# Patient Record
Sex: FEMALE | Race: WHITE | NOT HISPANIC OR LATINO | URBAN - METROPOLITAN AREA
[De-identification: names, ages, dates, MRNs, and addresses within clinical notes are randomized per-mention and may not be internally consistent; named-entity substitution may affect disease eponyms.]

---

## 2020-07-25 ENCOUNTER — TELEPHONE ENCOUNTER (OUTPATIENT)
Dept: URBAN - METROPOLITAN AREA CLINIC 13 | Facility: CLINIC | Age: 80
End: 2020-07-25

## 2020-07-25 RX ORDER — POLYETHYLENE GLYCOL 3350, SODIUM CHLORIDE, SODIUM BICARBONATE AND POTASSIUM CHLORIDE WITH LEMON FLAVOR 420; 11.2; 5.72; 1.48 G/4L; G/4L; G/4L; G/4L
USE AS DIRECTED POWDER, FOR SOLUTION ORAL
Qty: 1 | Refills: 0 | OUTPATIENT
Start: 2014-06-16 | End: 2014-08-28

## 2020-07-25 RX ORDER — DEXLANSOPRAZOLE 60 MG/1
TAKE 1 CAPSULE BY MOUTH EVERY DAY BEFORE BREAKFAST CAPSULE, DELAYED RELEASE ORAL
Qty: 30 | Refills: 0 | OUTPATIENT
Start: 2017-01-31 | End: 2017-08-29

## 2020-07-25 RX ORDER — DIAPER,BRIEF,INFANT-TODD,DISP
TAKE 2 TABLET DAILY GCN OVER 50+ EACH MISCELLANEOUS
Refills: 0 | OUTPATIENT
Start: 2009-08-31 | End: 2016-12-23

## 2020-07-25 RX ORDER — BUDESONIDE AND FORMOTEROL FUMARATE DIHYDRATE 160; 4.5 UG/1; UG/1
INHALE 2 PUFFS TWICE DAILY AEROSOL RESPIRATORY (INHALATION)
Refills: 0 | OUTPATIENT
Start: 2008-05-29 | End: 2009-01-26

## 2020-07-25 RX ORDER — LUBIPROSTONE 8 UG/1
TAKE 1 CAPSULE TWICE DAILY CAPSULE, GELATIN COATED ORAL
Qty: 60 | Refills: 3 | OUTPATIENT
Start: 2015-11-12 | End: 2016-05-16

## 2020-07-25 RX ORDER — CALCIUM CITRATE/VITAMIN D3 200MG-6.25
TAKE 1 TABLET DAILY. PATIENT STATES UNKNOWN DOSAGE TABLET ORAL
Refills: 0 | OUTPATIENT
Start: 2008-05-29 | End: 2016-12-23

## 2020-07-25 RX ORDER — OMEPRAZOLE 40 MG/1
TAKE 1 CAPSULE TWICE DAILY CAPSULE, DELAYED RELEASE ORAL
Qty: 180 | Refills: 3 | OUTPATIENT
End: 2017-11-07

## 2020-07-25 RX ORDER — RIFAXIMIN 550 MG/1
TAKE 1 TABLET TWICE DAILY X 10 DAYS TABLET ORAL
Qty: 20 | Refills: 0 | OUTPATIENT
Start: 2012-05-09 | End: 2012-06-14

## 2020-07-25 RX ORDER — BUDESONIDE 0.5 MG/2ML
INHALE 1 PUFFS TWICE DAILY INHALANT ORAL
Refills: 0 | OUTPATIENT
End: 2016-12-23

## 2020-07-25 RX ORDER — OMEPRAZOLE 40 MG/1
TAKE 1 CAPSULE TWICE DAILY CAPSULE, DELAYED RELEASE ORAL
Qty: 60 | Refills: 5 | OUTPATIENT
End: 2017-04-24

## 2020-07-25 RX ORDER — FORMOTEROL FUMARATE DIHYDRATE 20 UG/2ML
INHALE 1 PUFFS TWICE DAILY SOLUTION RESPIRATORY (INHALATION)
Refills: 0 | OUTPATIENT
End: 2016-12-23

## 2020-07-25 RX ORDER — MULTIVIT WITH CALCIUM,IRON,MIN 27MG-0.4MG
TAKE 1 TABLET DAILY TABLET ORAL
Refills: 0 | OUTPATIENT
Start: 2008-05-29 | End: 2009-01-26

## 2020-07-25 RX ORDER — OMEPRAZOLE 20 MG/1
TAKE 1 TABLET DAILY TABLET, DELAYED RELEASE ORAL
Refills: 0 | OUTPATIENT
End: 2016-12-23

## 2020-07-25 RX ORDER — ONDANSETRON HYDROCHLORIDE 4 MG/1
TAKE 1 TABLET BY MOUTH EVERY 8 HOURS AS NEEDED FOR NAUSEA OR VOMITING TABLET, FILM COATED ORAL
Qty: 25 | Refills: 0 | OUTPATIENT
Start: 2016-12-31 | End: 2017-04-24

## 2020-07-26 ENCOUNTER — TELEPHONE ENCOUNTER (OUTPATIENT)
Dept: URBAN - METROPOLITAN AREA CLINIC 13 | Facility: CLINIC | Age: 80
End: 2020-07-26

## 2020-07-26 RX ORDER — LEVOTHYROXINE SODIUM 50 UG/1
TAKE 1 TABLET DAILY AS DIRECTED TABLET ORAL
Refills: 0 | Status: ACTIVE | COMMUNITY
Start: 2008-05-29

## 2020-07-26 RX ORDER — HYDROCODONE BITARTRATE AND HOMATROPINE METHYLBROMIDE 5; 1.5 MG/5ML; MG/5ML
TK 1 TEA PO TID PRN FOR 14 DAYS SOLUTION ORAL
Qty: 120 | Refills: 0 | Status: ACTIVE | COMMUNITY
Start: 2011-08-19

## 2020-07-26 RX ORDER — AMOXICILLIN 875 MG/1
TABLET, FILM COATED ORAL
Qty: 14 | Refills: 0 | Status: ACTIVE | COMMUNITY
Start: 2013-06-29

## 2020-07-26 RX ORDER — MOXIFLOXACIN HYDROCHLORIDE TABLETS, 400 MG 400 MG/1
TABLET, FILM COATED ORAL
Qty: 10 | Refills: 0 | Status: ACTIVE | COMMUNITY
Start: 2014-02-07

## 2020-07-26 RX ORDER — AZITHROMYCIN DIHYDRATE 250 MG/1
TABLET, FILM COATED ORAL
Qty: 6 | Refills: 0 | Status: ACTIVE | COMMUNITY
Start: 2014-11-25

## 2020-07-26 RX ORDER — FENOFIBRATE 160 MG/1
TABLET ORAL
Qty: 30 | Refills: 0 | Status: ACTIVE | COMMUNITY
Start: 2013-01-29

## 2020-07-26 RX ORDER — HYDROCORTISONE 20 MG/1
TABLET ORAL
Qty: 240 | Refills: 0 | Status: ACTIVE | COMMUNITY
Start: 2013-09-12

## 2020-07-26 RX ORDER — DEXLANSOPRAZOLE 60 MG/1
TAKE 1 CAPSULE DAILY EVERY MORNING BEFORE BREAKFAST CAPSULE, DELAYED RELEASE ORAL
Qty: 90 | Refills: 1 | Status: ACTIVE | COMMUNITY

## 2020-07-26 RX ORDER — FENOFIBRATE 160 MG/1
TABLET ORAL
Qty: 30 | Refills: 0 | Status: ACTIVE | COMMUNITY
Start: 2013-09-12

## 2020-07-26 RX ORDER — LEVOTHYROXINE SODIUM 0.05 MG/1
TABLET ORAL
Qty: 90 | Refills: 0 | Status: ACTIVE | COMMUNITY
Start: 2013-06-24

## 2020-07-26 RX ORDER — FENOFIBRATE 160 MG/1
TAKE 1 TABLET DAILY TABLET ORAL
Refills: 0 | Status: ACTIVE | COMMUNITY

## 2020-07-26 RX ORDER — FLUCONAZOLE 150 MG/1
TABLET ORAL
Qty: 1 | Refills: 0 | Status: ACTIVE | COMMUNITY
Start: 2013-12-04

## 2020-07-26 RX ORDER — NITROFURANTOIN MONOHYDRATE/MACROCRYSTALLINE 25; 75 MG/1; MG/1
TK ONE C PO BID FOR 7 DAYS CAPSULE ORAL
Qty: 14 | Refills: 0 | Status: ACTIVE | COMMUNITY
Start: 2011-08-03

## 2020-07-26 RX ORDER — PREDNISONE 20 MG/1
TABLET ORAL
Qty: 5 | Refills: 0 | Status: ACTIVE | COMMUNITY
Start: 2013-09-20

## 2020-07-26 RX ORDER — FENOFIBRATE 160 MG/1
TK ONE T PO D TABLET ORAL
Qty: 30 | Refills: 0 | Status: ACTIVE | COMMUNITY
Start: 2010-07-08

## 2020-07-26 RX ORDER — OMEPRAZOLE 40 MG/1
CAPSULE, DELAYED RELEASE ORAL
Qty: 60 | Refills: 0 | Status: ACTIVE | COMMUNITY
Start: 2013-10-29

## 2020-07-26 RX ORDER — PREDNISONE 10 MG/1
TABLET ORAL
Qty: 18 | Refills: 0 | Status: ACTIVE | COMMUNITY
Start: 2014-06-24

## 2020-07-26 RX ORDER — AZELASTINE 137 UG/1
SPRAY, METERED NASAL
Qty: 30 | Refills: 0 | Status: ACTIVE | COMMUNITY
Start: 2014-05-24

## 2020-07-26 RX ORDER — DOXYCYCLINE 100 MG/1
CAPSULE ORAL
Qty: 10 | Refills: 0 | Status: ACTIVE | COMMUNITY
Start: 2014-05-24

## 2020-07-26 RX ORDER — CEPHALEXIN 500 MG/1
TK 4 CS PO 1 HOUR PRIOR TO APPOINTMENT CAPSULE ORAL
Qty: 12 | Refills: 0 | Status: ACTIVE | COMMUNITY
Start: 2011-11-07

## 2020-07-26 RX ORDER — FEXOFENADINE HCL 180 MG/1
TAKE 1 TABLET DAILY TABLET ORAL
Refills: 0 | Status: ACTIVE | COMMUNITY

## 2020-07-26 RX ORDER — FLUCONAZOLE 100 MG/1
TABLET ORAL
Qty: 3 | Refills: 0 | Status: ACTIVE | COMMUNITY
Start: 2014-12-02

## 2020-07-26 RX ORDER — OMEPRAZOLE 40 MG/1
TAKE 1 CAPSULE BEDTIME CAPSULE, DELAYED RELEASE ORAL
Refills: 6 | Status: ACTIVE | COMMUNITY

## 2020-07-26 RX ORDER — SUCRALFATE 1 G/10ML
SUSPENSION ORAL
Qty: 255 | Refills: 0 | Status: ACTIVE | COMMUNITY
Start: 2014-03-31

## 2020-07-26 RX ORDER — CYCLOBENZAPRINE HYDROCHLORIDE 5 MG/1
TABLET, FILM COATED ORAL
Qty: 30 | Refills: 0 | Status: ACTIVE | COMMUNITY
Start: 2014-09-17

## 2020-07-26 RX ORDER — HYDROCORTISONE VALERATE CREAM 2 MG/G
CREAM TOPICAL
Qty: 60 | Refills: 0 | Status: ACTIVE | COMMUNITY
Start: 2012-06-11

## 2020-07-26 RX ORDER — CEFUROXIME AXETIL 500 MG/1
TABLET, FILM COATED ORAL
Qty: 14 | Refills: 0 | Status: ACTIVE | COMMUNITY
Start: 2014-05-29

## 2020-07-26 RX ORDER — RALOXIFENE HCL 60 MG
TK 1 T PO QD TABLET ORAL
Qty: 30 | Refills: 0 | Status: ACTIVE | COMMUNITY
Start: 2011-07-11

## 2020-07-26 RX ORDER — FORMOTEROL FUMARATE DIHYDRATE 20 UG/2ML
USE 1 VIAL IN NEBULIZER EVERY 12 HOURS SOLUTION RESPIRATORY (INHALATION)
Refills: 0 | Status: ACTIVE | COMMUNITY

## 2020-07-26 RX ORDER — AZITHROMYCIN DIHYDRATE 250 MG/1
FPD TABLET, FILM COATED ORAL
Qty: 6 | Refills: 0 | Status: ACTIVE | COMMUNITY
Start: 2011-08-19

## 2020-07-26 RX ORDER — BUDESONIDE 0.5 MG/2ML
USE 1 UNIT DOSE VIA NEBULIZER TWO TIMES A DAY INHALANT ORAL
Refills: 0 | Status: ACTIVE | COMMUNITY

## 2020-07-26 RX ORDER — OMEPRAZOLE 40 MG/1
CAPSULE, DELAYED RELEASE ORAL
Qty: 60 | Refills: 0 | Status: ACTIVE | COMMUNITY
Start: 2013-04-02

## 2020-07-26 RX ORDER — PREDNISONE 20 MG/1
TABLET ORAL
Qty: 5 | Refills: 0 | Status: ACTIVE | COMMUNITY
Start: 2014-06-10

## 2020-07-26 RX ORDER — FAMOTIDINE 20 MG/1
TAKE 1 TABLET DAILY AS DIRECTED TABLET, FILM COATED ORAL
Refills: 0 | Status: ACTIVE | COMMUNITY

## 2020-07-26 RX ORDER — ONDANSETRON HYDROCHLORIDE 4 MG/1
TABLET, FILM COATED ORAL
Qty: 12 | Refills: 0 | Status: ACTIVE | COMMUNITY
Start: 2014-11-10

## 2020-07-26 RX ORDER — FLUTICASONE PROPIONATE 50 UG/1
SPRAY, METERED NASAL
Qty: 16 | Refills: 0 | Status: ACTIVE | COMMUNITY
Start: 2014-05-24

## 2020-07-26 RX ORDER — AMOXICILLIN 875 MG/1
TABLET, FILM COATED ORAL
Qty: 20 | Refills: 0 | Status: ACTIVE | COMMUNITY
Start: 2014-12-17

## 2020-07-26 RX ORDER — OLMESARTAN MEDOXOMIL 20 MG/1
TAKE 1 TABLET DAILY TABLET, FILM COATED ORAL
Refills: 0 | Status: ACTIVE | COMMUNITY
Start: 2011-03-03

## 2020-07-26 RX ORDER — FLUCONAZOLE 150 MG/1
TABLET ORAL
Qty: 1 | Refills: 0 | Status: ACTIVE | COMMUNITY
Start: 2013-10-03

## 2021-04-20 ENCOUNTER — CLAIMS CREATED FROM THE CLAIM WINDOW (OUTPATIENT)
Dept: URBAN - METROPOLITAN AREA MEDICAL CENTER 19 | Facility: MEDICAL CENTER | Age: 81
End: 2021-04-20
Payer: MEDICARE

## 2021-04-20 DIAGNOSIS — R11.2 NAUSEA WITH VOMITING, UNSPECIFIED: ICD-10-CM

## 2021-04-20 DIAGNOSIS — R74.8 ABNORMAL ALKALINE PHOSPHATASE TEST: ICD-10-CM

## 2021-04-20 DIAGNOSIS — R10.11 RIGHT UPPER QUADRANT PAIN: ICD-10-CM

## 2021-04-20 DIAGNOSIS — K30 FUNCTIONAL DYSPEPSIA: ICD-10-CM

## 2021-04-20 DIAGNOSIS — K21.9 GASTRO-ESOPHAGEAL REFLUX DISEASE WITHOUT ESOPHAGITIS: ICD-10-CM

## 2021-04-20 PROCEDURE — 99222 1ST HOSP IP/OBS MODERATE 55: CPT | Performed by: INTERNAL MEDICINE

## 2021-04-21 ENCOUNTER — OFFICE VISIT (OUTPATIENT)
Dept: URBAN - METROPOLITAN AREA CLINIC 107 | Facility: CLINIC | Age: 81
End: 2021-04-21

## 2021-04-21 ENCOUNTER — CLAIMS CREATED FROM THE CLAIM WINDOW (OUTPATIENT)
Dept: URBAN - METROPOLITAN AREA MEDICAL CENTER 19 | Facility: MEDICAL CENTER | Age: 81
End: 2021-04-21
Payer: MEDICARE

## 2021-04-21 DIAGNOSIS — D64.89 ANEMIA DUE TO OTHER CAUSE: ICD-10-CM

## 2021-04-21 DIAGNOSIS — R11.2 NAUSEA WITH VOMITING, UNSPECIFIED: ICD-10-CM

## 2021-04-21 DIAGNOSIS — K31.819 ANGIODYSPLASIA OF STOMACH AND DUODENUM WITHOUT BLEEDING: ICD-10-CM

## 2021-04-21 PROCEDURE — 43270 EGD LESION ABLATION: CPT | Performed by: INTERNAL MEDICINE

## 2021-04-21 RX ORDER — OLMESARTAN MEDOXOMIL 20 MG/1
TAKE 1 TABLET DAILY TABLET, FILM COATED ORAL
Refills: 0 | Status: ACTIVE | COMMUNITY
Start: 2011-03-03

## 2021-04-21 RX ORDER — FORMOTEROL FUMARATE DIHYDRATE 20 UG/2ML
USE 1 VIAL IN NEBULIZER EVERY 12 HOURS SOLUTION RESPIRATORY (INHALATION)
Refills: 0 | Status: ACTIVE | COMMUNITY

## 2021-04-21 RX ORDER — CEFUROXIME AXETIL 500 MG/1
TABLET, FILM COATED ORAL
Qty: 14 | Refills: 0 | Status: ACTIVE | COMMUNITY
Start: 2014-05-29

## 2021-04-21 RX ORDER — AMOXICILLIN 875 MG/1
TABLET, FILM COATED ORAL
Qty: 14 | Refills: 0 | Status: ACTIVE | COMMUNITY
Start: 2013-06-29

## 2021-04-21 RX ORDER — AZELASTINE 137 UG/1
SPRAY, METERED NASAL
Qty: 30 | Refills: 0 | Status: ACTIVE | COMMUNITY
Start: 2014-05-24

## 2021-04-21 RX ORDER — SUCRALFATE 1 G/10ML
SUSPENSION ORAL
Qty: 255 | Refills: 0 | Status: ACTIVE | COMMUNITY
Start: 2014-03-31

## 2021-04-21 RX ORDER — NITROFURANTOIN MONOHYDRATE/MACROCRYSTALLINE 25; 75 MG/1; MG/1
TK ONE C PO BID FOR 7 DAYS CAPSULE ORAL
Qty: 14 | Refills: 0 | Status: ACTIVE | COMMUNITY
Start: 2011-08-03

## 2021-04-21 RX ORDER — CEPHALEXIN 500 MG/1
TK 4 CS PO 1 HOUR PRIOR TO APPOINTMENT CAPSULE ORAL
Qty: 12 | Refills: 0 | Status: ACTIVE | COMMUNITY
Start: 2011-11-07

## 2021-04-21 RX ORDER — FAMOTIDINE 20 MG/1
TAKE 1 TABLET DAILY AS DIRECTED TABLET, FILM COATED ORAL
Refills: 0 | Status: ACTIVE | COMMUNITY

## 2021-04-21 RX ORDER — OMEPRAZOLE 40 MG/1
TAKE 1 CAPSULE BEDTIME CAPSULE, DELAYED RELEASE ORAL
Refills: 6 | Status: ACTIVE | COMMUNITY

## 2021-04-21 RX ORDER — FLUCONAZOLE 100 MG/1
TABLET ORAL
Qty: 3 | Refills: 0 | Status: ACTIVE | COMMUNITY
Start: 2014-12-02

## 2021-04-21 RX ORDER — BUDESONIDE 0.5 MG/2ML
USE 1 UNIT DOSE VIA NEBULIZER TWO TIMES A DAY INHALANT ORAL
Refills: 0 | Status: ACTIVE | COMMUNITY

## 2021-04-21 RX ORDER — LEVOTHYROXINE SODIUM 0.05 MG/1
TABLET ORAL
Qty: 90 | Refills: 0 | Status: ACTIVE | COMMUNITY
Start: 2013-06-24

## 2021-04-21 RX ORDER — LEVOTHYROXINE SODIUM 50 UG/1
TAKE 1 TABLET DAILY AS DIRECTED TABLET ORAL
Refills: 0 | Status: ACTIVE | COMMUNITY
Start: 2008-05-29

## 2021-04-21 RX ORDER — FENOFIBRATE 160 MG/1
TK ONE T PO D TABLET ORAL
Qty: 30 | Refills: 0 | Status: ACTIVE | COMMUNITY
Start: 2010-07-08

## 2021-04-21 RX ORDER — RALOXIFENE HCL 60 MG
TK 1 T PO QD TABLET ORAL
Qty: 30 | Refills: 0 | Status: ACTIVE | COMMUNITY
Start: 2011-07-11

## 2021-04-21 RX ORDER — DOXYCYCLINE 100 MG/1
CAPSULE ORAL
Qty: 10 | Refills: 0 | Status: ACTIVE | COMMUNITY
Start: 2014-05-24

## 2021-04-21 RX ORDER — HYDROCORTISONE VALERATE CREAM 2 MG/G
CREAM TOPICAL
Qty: 60 | Refills: 0 | Status: ACTIVE | COMMUNITY
Start: 2012-06-11

## 2021-04-21 RX ORDER — MOXIFLOXACIN HYDROCHLORIDE TABLETS, 400 MG 400 MG/1
TABLET, FILM COATED ORAL
Qty: 10 | Refills: 0 | Status: ACTIVE | COMMUNITY
Start: 2014-02-07

## 2021-04-21 RX ORDER — PREDNISONE 10 MG/1
TABLET ORAL
Qty: 18 | Refills: 0 | Status: ACTIVE | COMMUNITY
Start: 2014-06-24

## 2021-04-21 RX ORDER — ONDANSETRON HYDROCHLORIDE 4 MG/1
TABLET, FILM COATED ORAL
Qty: 12 | Refills: 0 | Status: ACTIVE | COMMUNITY
Start: 2014-11-10

## 2021-04-21 RX ORDER — HYDROCODONE BITARTRATE AND HOMATROPINE METHYLBROMIDE 5; 1.5 MG/5ML; MG/5ML
TK 1 TEA PO TID PRN FOR 14 DAYS SOLUTION ORAL
Qty: 120 | Refills: 0 | Status: ACTIVE | COMMUNITY
Start: 2011-08-19

## 2021-04-21 RX ORDER — DEXLANSOPRAZOLE 60 MG/1
TAKE 1 CAPSULE DAILY EVERY MORNING BEFORE BREAKFAST CAPSULE, DELAYED RELEASE ORAL
Qty: 90 | Refills: 1 | Status: ACTIVE | COMMUNITY

## 2021-04-21 RX ORDER — FEXOFENADINE HCL 180 MG/1
TAKE 1 TABLET DAILY TABLET ORAL
Refills: 0 | Status: ACTIVE | COMMUNITY

## 2021-04-21 RX ORDER — FENOFIBRATE 160 MG/1
TAKE 1 TABLET DAILY TABLET ORAL
Refills: 0 | Status: ACTIVE | COMMUNITY

## 2021-04-21 RX ORDER — CYCLOBENZAPRINE HYDROCHLORIDE 5 MG/1
TABLET, FILM COATED ORAL
Qty: 30 | Refills: 0 | Status: ACTIVE | COMMUNITY
Start: 2014-09-17

## 2021-04-21 RX ORDER — HYDROCORTISONE 20 MG/1
TABLET ORAL
Qty: 240 | Refills: 0 | Status: ACTIVE | COMMUNITY
Start: 2013-09-12

## 2021-04-21 RX ORDER — FLUCONAZOLE 150 MG/1
TABLET ORAL
Qty: 1 | Refills: 0 | Status: ACTIVE | COMMUNITY
Start: 2013-10-03

## 2021-04-21 RX ORDER — PREDNISONE 20 MG/1
TABLET ORAL
Qty: 5 | Refills: 0 | Status: ACTIVE | COMMUNITY
Start: 2013-09-20

## 2021-04-21 RX ORDER — AZITHROMYCIN DIHYDRATE 250 MG/1
FPD TABLET, FILM COATED ORAL
Qty: 6 | Refills: 0 | Status: ACTIVE | COMMUNITY
Start: 2011-08-19

## 2021-04-21 RX ORDER — FLUTICASONE PROPIONATE 50 UG/1
SPRAY, METERED NASAL
Qty: 16 | Refills: 0 | Status: ACTIVE | COMMUNITY
Start: 2014-05-24

## 2021-04-21 NOTE — HPI-TODAY'S VISIT:
Ms. Davis is a 77-year-old  female with a history of atrial fibrillation status post cardioversion, asthma, GERD, and nausea presents for follow-up.   She was last seen here in November 2017.   She was previously seen on 8/29 /17.  At that time, she reported a 1 year history of illness.  She described multiple pulmonary infections throughout the previous year which were felt to possibly be consistent with reflux.  She hasn't had a productive cough and a recent history of pneumonia at the time.  She tells me that she had a bronchoscopy performed which revealed "acid in her lungs."  CT scan of the chest was done on 12 2017, but I do not have the results.   The patient is better from a pulmonary standpoint.  However, she is concerned that reflux may be contributing to her recurrent pulmonary infections.

## 2021-04-22 ENCOUNTER — CLAIMS CREATED FROM THE CLAIM WINDOW (OUTPATIENT)
Dept: URBAN - METROPOLITAN AREA MEDICAL CENTER 19 | Facility: MEDICAL CENTER | Age: 81
End: 2021-04-22
Payer: MEDICARE

## 2021-04-22 DIAGNOSIS — K31.819 ANGIODYSPLASIA OF STOMACH AND DUODENUM WITHOUT BLEEDING: ICD-10-CM

## 2021-04-22 DIAGNOSIS — K21.9 GASTRO-ESOPHAGEAL REFLUX DISEASE WITHOUT ESOPHAGITIS: ICD-10-CM

## 2021-04-22 DIAGNOSIS — R11.2 NAUSEA WITH VOMITING, UNSPECIFIED: ICD-10-CM

## 2021-04-22 PROCEDURE — 99232 SBSQ HOSP IP/OBS MODERATE 35: CPT | Performed by: INTERNAL MEDICINE

## 2021-05-25 ENCOUNTER — OFFICE VISIT (OUTPATIENT)
Dept: URBAN - METROPOLITAN AREA CLINIC 113 | Facility: CLINIC | Age: 81
End: 2021-05-25
Payer: MEDICARE

## 2021-05-25 ENCOUNTER — TELEPHONE ENCOUNTER (OUTPATIENT)
Dept: URBAN - METROPOLITAN AREA CLINIC 113 | Facility: CLINIC | Age: 81
End: 2021-05-25

## 2021-05-25 VITALS
RESPIRATION RATE: 20 BRPM | BODY MASS INDEX: 18.99 KG/M2 | WEIGHT: 114 LBS | HEIGHT: 65 IN | HEART RATE: 69 BPM | TEMPERATURE: 96.6 F | DIASTOLIC BLOOD PRESSURE: 76 MMHG | SYSTOLIC BLOOD PRESSURE: 184 MMHG

## 2021-05-25 DIAGNOSIS — K21.9 GERD: ICD-10-CM

## 2021-05-25 DIAGNOSIS — K59.01 SLOW TRANSIT CONSTIPATION: ICD-10-CM

## 2021-05-25 DIAGNOSIS — K31.811 AVM (ARTERIOVENOUS MALFORMATION) OF STOMACH, ACQUIRED WITH HEMORRHAGE: ICD-10-CM

## 2021-05-25 DIAGNOSIS — D64.9 NORMOCYTIC ANEMIA: ICD-10-CM

## 2021-05-25 PROCEDURE — 99214 OFFICE O/P EST MOD 30 MIN: CPT | Performed by: INTERNAL MEDICINE

## 2021-05-25 RX ORDER — AZELASTINE 137 UG/1
SPRAY, METERED NASAL
Qty: 30 | Refills: 0 | COMMUNITY
Start: 2014-05-24

## 2021-05-25 RX ORDER — FLUCONAZOLE 100 MG/1
TABLET ORAL
Qty: 3 | Refills: 0 | COMMUNITY
Start: 2014-12-02

## 2021-05-25 RX ORDER — ONDANSETRON HYDROCHLORIDE 4 MG/1
TABLET, FILM COATED ORAL
Qty: 12 | Refills: 0 | COMMUNITY
Start: 2014-11-10

## 2021-05-25 RX ORDER — RALOXIFENE HCL 60 MG
TK 1 T PO QD TABLET ORAL
Qty: 30 | Refills: 0 | COMMUNITY
Start: 2011-07-11

## 2021-05-25 RX ORDER — PREDNISONE 20 MG/1
TABLET ORAL
Qty: 5 | Refills: 0 | COMMUNITY
Start: 2013-09-20

## 2021-05-25 RX ORDER — FORMOTEROL FUMARATE DIHYDRATE 20 UG/2ML
USE 1 VIAL IN NEBULIZER EVERY 12 HOURS SOLUTION RESPIRATORY (INHALATION)
Refills: 0 | COMMUNITY

## 2021-05-25 RX ORDER — CEFUROXIME AXETIL 500 MG/1
TABLET, FILM COATED ORAL
Qty: 14 | Refills: 0 | COMMUNITY
Start: 2014-05-29

## 2021-05-25 RX ORDER — AZITHROMYCIN DIHYDRATE 250 MG/1
FPD TABLET, FILM COATED ORAL
Qty: 6 | Refills: 0 | COMMUNITY
Start: 2011-08-19

## 2021-05-25 RX ORDER — DEXLANSOPRAZOLE 60 MG/1
TAKE 1 CAPSULE DAILY EVERY MORNING BEFORE BREAKFAST CAPSULE, DELAYED RELEASE ORAL
Qty: 90 | Refills: 1 | Status: ACTIVE | COMMUNITY

## 2021-05-25 RX ORDER — MOXIFLOXACIN HYDROCHLORIDE TABLETS, 400 MG 400 MG/1
TABLET, FILM COATED ORAL
Qty: 10 | Refills: 0 | COMMUNITY
Start: 2014-02-07

## 2021-05-25 RX ORDER — BUDESONIDE 0.5 MG/2ML
USE 1 UNIT DOSE VIA NEBULIZER TWO TIMES A DAY INHALANT ORAL
Refills: 0 | Status: ACTIVE | COMMUNITY

## 2021-05-25 RX ORDER — FORMOTEROL FUMARATE DIHYDRATE 20 UG/2ML
2 ML SOLUTION RESPIRATORY (INHALATION) TWICE A DAY
Status: ACTIVE | COMMUNITY

## 2021-05-25 RX ORDER — LEVOTHYROXINE SODIUM 50 UG/1
TAKE 1 TABLET DAILY AS DIRECTED TABLET ORAL
Refills: 0 | Status: ACTIVE | COMMUNITY
Start: 2008-05-29

## 2021-05-25 RX ORDER — OMEPRAZOLE 40 MG/1
TAKE 1 CAPSULE BEDTIME CAPSULE, DELAYED RELEASE ORAL
Refills: 6 | COMMUNITY

## 2021-05-25 RX ORDER — FLUCONAZOLE 150 MG/1
TABLET ORAL
Qty: 1 | Refills: 0 | COMMUNITY
Start: 2013-10-03

## 2021-05-25 RX ORDER — FLUTICASONE PROPIONATE 50 UG/1
SPRAY, METERED NASAL
Qty: 16 | Refills: 0 | COMMUNITY
Start: 2014-05-24

## 2021-05-25 RX ORDER — FENOFIBRATE 160 MG/1
TK ONE T PO D TABLET ORAL
Qty: 30 | Refills: 0 | COMMUNITY
Start: 2010-07-08

## 2021-05-25 RX ORDER — FAMOTIDINE 20 MG/1
TAKE 1 TABLET DAILY AS DIRECTED TABLET, FILM COATED ORAL
Refills: 0 | COMMUNITY

## 2021-05-25 RX ORDER — VENLAFAXINE HYDROCHLORIDE 37.5 MG/1
1 CAPSULE WITH FOOD CAPSULE, EXTENDED RELEASE ORAL ONCE A DAY
Status: ACTIVE | COMMUNITY

## 2021-05-25 RX ORDER — FEXOFENADINE HCL 180 MG/1
TAKE 1 TABLET DAILY TABLET ORAL
Refills: 0 | COMMUNITY

## 2021-05-25 RX ORDER — HYDROCORTISONE 20 MG/1
TABLET ORAL
Qty: 240 | Refills: 0 | COMMUNITY
Start: 2013-09-12

## 2021-05-25 RX ORDER — AMOXICILLIN 875 MG/1
1 CAPSULE TABLET, FILM COATED ORAL TWICE A DAY
Refills: 0 | Status: ACTIVE | COMMUNITY
Start: 2013-06-29

## 2021-05-25 RX ORDER — HYDROCODONE BITARTRATE AND HOMATROPINE METHYLBROMIDE 5; 1.5 MG/5ML; MG/5ML
TK 1 TEA PO TID PRN FOR 14 DAYS SOLUTION ORAL
Qty: 120 | Refills: 0 | COMMUNITY
Start: 2011-08-19

## 2021-05-25 RX ORDER — CYCLOBENZAPRINE HYDROCHLORIDE 5 MG/1
TABLET, FILM COATED ORAL
Qty: 30 | Refills: 0 | COMMUNITY
Start: 2014-09-17

## 2021-05-25 RX ORDER — FENOFIBRATE 160 MG/1
TAKE 1 TABLET DAILY TABLET ORAL
Refills: 0 | COMMUNITY

## 2021-05-25 RX ORDER — OLMESARTAN MEDOXOMIL 20 MG/1
TAKE 1 TABLET DAILY TABLET, FILM COATED ORAL
Refills: 0 | Status: ACTIVE | COMMUNITY
Start: 2011-03-03

## 2021-05-25 RX ORDER — SUCRALFATE 1 G/10ML
SUSPENSION ORAL
Qty: 255 | Refills: 0 | COMMUNITY
Start: 2014-03-31

## 2021-05-25 RX ORDER — HYDROCORTISONE VALERATE CREAM 2 MG/G
CREAM TOPICAL
Qty: 60 | Refills: 0 | COMMUNITY
Start: 2012-06-11

## 2021-05-25 RX ORDER — PREDNISONE 10 MG/1
TABLET ORAL
Qty: 18 | Refills: 0 | COMMUNITY
Start: 2014-06-24

## 2021-05-25 RX ORDER — NITROFURANTOIN MONOHYDRATE/MACROCRYSTALLINE 25; 75 MG/1; MG/1
TK ONE C PO BID FOR 7 DAYS CAPSULE ORAL
Qty: 14 | Refills: 0 | COMMUNITY
Start: 2011-08-03

## 2021-05-25 RX ORDER — CEPHALEXIN 500 MG/1
TK 4 CS PO 1 HOUR PRIOR TO APPOINTMENT CAPSULE ORAL
Qty: 12 | Refills: 0 | COMMUNITY
Start: 2011-11-07

## 2021-05-25 RX ORDER — LEVOTHYROXINE SODIUM 0.05 MG/1
TABLET ORAL
Qty: 90 | Refills: 0 | COMMUNITY
Start: 2013-06-24

## 2021-05-25 RX ORDER — MONTELUKAST SODIUM 10 MG/1
1 TABLET TABLET, FILM COATED ORAL ONCE A DAY
Status: ACTIVE | COMMUNITY

## 2021-05-25 RX ORDER — DOXYCYCLINE 100 MG/1
CAPSULE ORAL
Qty: 10 | Refills: 0 | COMMUNITY
Start: 2014-05-24

## 2021-05-25 NOTE — HPI-OTHER HISTORIES
CT of the abdomen pelvis with contrast on 4/14/2021 showed diverticulosis and bilateral renal cysts. Abdominal ultrasound 2/23/2021 revealed mild intrahepatic biliary duct dilation with enlarged common bile duct measuring 10 mm, which may be physiologic due to prior cholecystectomy.  Also notable for chronic medical renal disease. Her last colonoscopy was performed 6/14/2016 by Dr. Flowers.  Findings included diverticulosis in the sigmoid, descending and transverse colon and one 8 mm tubular adenoma in the ascending colon.

## 2021-05-25 NOTE — PHYSICAL EXAM NEUROLOGIC:
Noted. This was also stated in a separate encounter from 1/14/21 that was sent to PCP.   oriented to person, place and time , normal mood with an appropriate affect

## 2021-05-25 NOTE — HPI-TODAY'S VISIT:
80-year-old female with a history of atrial fibrillation status post cardioversion, asthma, GERD, presenting for follow-up after hospitalization.  She was last seen in the office in November 2017 by Dr. Flowers for follow-up regarding GERD and recurrent pulmonary infections, with question of recurrent aspiration.  An EGD with Bravo pH was recommended to assess for pathologic reflux, but she did not proceed with the procedure. She was seen in hospital consultation on 4/20/2021 by Dr. Blount for evaluation of abdominal pain and nausea/vomiting.  Recent CT and abdominal ultrasound were unremarkable.  She was recommended pantoprazole 40 mg twice daily and famotidine 40 mg prior to bedtime for management of GERD and functional dyspepsia. An EGD was planned for evaluation of anemia. 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 Her constipation has improved with a high fiber diet. Her bowel habits are regular without blood per rectum. On occasion, she does have some difficulty with evacuation and straining. Her appetite has improved, and weight stabilized with initiation of Boost 1 can daily. She has not experienced further abdominal pain. She has occasional nausea which responds to Zofran when needed. No vomiting. Her reflux is managed with Dexilant. She has infrequent heartburn which responds to Tums when needed. She is currently on amoxicillin for treatment of an upper respiratory infection. Her hemoglobin was 9.9 at the time of hospital discharge, with MCV 86.5. Her bloodwork has not been repeated. Her daughter is on speaker phone throughout the interview.

## 2021-05-26 ENCOUNTER — TELEPHONE ENCOUNTER (OUTPATIENT)
Dept: URBAN - METROPOLITAN AREA CLINIC 113 | Facility: CLINIC | Age: 81
End: 2021-05-26

## 2021-05-26 LAB
BASO (ABSOLUTE): 0
BASOS: 1
EOS (ABSOLUTE): 0
EOS: 1
FERRITIN, SERUM: 131
HEMATOCRIT: 32.3
HEMATOLOGY COMMENTS:: (no result)
HEMOGLOBIN: 10.3
IMMATURE CELLS: (no result)
IMMATURE GRANS (ABS): 0
IMMATURE GRANULOCYTES: 0
LYMPHS (ABSOLUTE): 1.4
LYMPHS: 29
MCH: 28.9
MCHC: 31.9
MCV: 91
MONOCYTES(ABSOLUTE): 1
MONOCYTES: 21
NEUTROPHILS (ABSOLUTE): 2.3
NEUTROPHILS: 48
NRBC: (no result)
PLATELETS: 306
RBC: 3.57
RDW: 12.5
WBC: 4.7

## 2021-05-26 RX ORDER — FLUCONAZOLE 100 MG/1
TABLET ORAL
Qty: 3 | Refills: 0 | COMMUNITY
Start: 2014-12-02

## 2021-05-26 RX ORDER — FLUTICASONE PROPIONATE 50 UG/1
SPRAY, METERED NASAL
Qty: 16 | Refills: 0 | COMMUNITY
Start: 2014-05-24

## 2021-05-26 RX ORDER — CEPHALEXIN 500 MG/1
TK 4 CS PO 1 HOUR PRIOR TO APPOINTMENT CAPSULE ORAL
Qty: 12 | Refills: 0 | COMMUNITY
Start: 2011-11-07

## 2021-05-26 RX ORDER — CYCLOBENZAPRINE HYDROCHLORIDE 5 MG/1
TABLET, FILM COATED ORAL
Qty: 30 | Refills: 0 | COMMUNITY
Start: 2014-09-17

## 2021-05-26 RX ORDER — SUCRALFATE 1 G/10ML
SUSPENSION ORAL
Qty: 255 | Refills: 0 | COMMUNITY
Start: 2014-03-31

## 2021-05-26 RX ORDER — OMEPRAZOLE 40 MG/1
TAKE 1 CAPSULE BEDTIME CAPSULE, DELAYED RELEASE ORAL
Refills: 6 | COMMUNITY

## 2021-05-26 RX ORDER — LEVOTHYROXINE SODIUM 0.05 MG/1
TABLET ORAL
Qty: 90 | Refills: 0 | COMMUNITY
Start: 2013-06-24

## 2021-05-26 RX ORDER — ONDANSETRON HYDROCHLORIDE 4 MG/1
TABLET, FILM COATED ORAL
Qty: 12 | Refills: 0 | COMMUNITY
Start: 2014-11-10

## 2021-05-26 RX ORDER — FAMOTIDINE 20 MG/1
TAKE 1 TABLET DAILY AS DIRECTED TABLET, FILM COATED ORAL
Refills: 0 | COMMUNITY

## 2021-05-26 RX ORDER — AZITHROMYCIN DIHYDRATE 250 MG/1
FPD TABLET, FILM COATED ORAL
Qty: 6 | Refills: 0 | COMMUNITY
Start: 2011-08-19

## 2021-05-26 RX ORDER — HYDROCODONE BITARTRATE AND HOMATROPINE METHYLBROMIDE 5; 1.5 MG/5ML; MG/5ML
TK 1 TEA PO TID PRN FOR 14 DAYS SOLUTION ORAL
Qty: 120 | Refills: 0 | COMMUNITY
Start: 2011-08-19

## 2021-05-26 RX ORDER — DOXYCYCLINE 100 MG/1
CAPSULE ORAL
Qty: 10 | Refills: 0 | COMMUNITY
Start: 2014-05-24

## 2021-05-26 RX ORDER — FORMOTEROL FUMARATE DIHYDRATE 20 UG/2ML
USE 1 VIAL IN NEBULIZER EVERY 12 HOURS SOLUTION RESPIRATORY (INHALATION)
Refills: 0 | COMMUNITY

## 2021-05-26 RX ORDER — BUDESONIDE 0.5 MG/2ML
USE 1 UNIT DOSE VIA NEBULIZER TWO TIMES A DAY INHALANT ORAL
Refills: 0 | Status: ACTIVE | COMMUNITY

## 2021-05-26 RX ORDER — FEXOFENADINE HCL 180 MG/1
TAKE 1 TABLET DAILY TABLET ORAL
Refills: 0 | COMMUNITY

## 2021-05-26 RX ORDER — AZELASTINE 137 UG/1
SPRAY, METERED NASAL
Qty: 30 | Refills: 0 | COMMUNITY
Start: 2014-05-24

## 2021-05-26 RX ORDER — MOXIFLOXACIN HYDROCHLORIDE TABLETS, 400 MG 400 MG/1
TABLET, FILM COATED ORAL
Qty: 10 | Refills: 0 | COMMUNITY
Start: 2014-02-07

## 2021-05-26 RX ORDER — PREDNISONE 20 MG/1
TABLET ORAL
Qty: 5 | Refills: 0 | COMMUNITY
Start: 2013-09-20

## 2021-05-26 RX ORDER — OLMESARTAN MEDOXOMIL 20 MG/1
TAKE 1 TABLET DAILY TABLET, FILM COATED ORAL
Refills: 0 | Status: ACTIVE | COMMUNITY
Start: 2011-03-03

## 2021-05-26 RX ORDER — MONTELUKAST SODIUM 10 MG/1
1 TABLET TABLET, FILM COATED ORAL ONCE A DAY
Status: ACTIVE | COMMUNITY

## 2021-05-26 RX ORDER — FENOFIBRATE 160 MG/1
TK ONE T PO D TABLET ORAL
Qty: 30 | Refills: 0 | COMMUNITY
Start: 2010-07-08

## 2021-05-26 RX ORDER — RALOXIFENE HCL 60 MG
TK 1 T PO QD TABLET ORAL
Qty: 30 | Refills: 0 | COMMUNITY
Start: 2011-07-11

## 2021-05-26 RX ORDER — VENLAFAXINE HYDROCHLORIDE 37.5 MG/1
1 CAPSULE WITH FOOD CAPSULE, EXTENDED RELEASE ORAL ONCE A DAY
Status: ACTIVE | COMMUNITY

## 2021-05-26 RX ORDER — AMOXICILLIN 875 MG/1
1 CAPSULE TABLET, FILM COATED ORAL TWICE A DAY
Refills: 0 | Status: ACTIVE | COMMUNITY
Start: 2013-06-29

## 2021-05-26 RX ORDER — FENOFIBRATE 160 MG/1
TAKE 1 TABLET DAILY TABLET ORAL
Refills: 0 | COMMUNITY

## 2021-05-26 RX ORDER — CEFUROXIME AXETIL 500 MG/1
TABLET, FILM COATED ORAL
Qty: 14 | Refills: 0 | COMMUNITY
Start: 2014-05-29

## 2021-05-26 RX ORDER — PREDNISONE 10 MG/1
TABLET ORAL
Qty: 18 | Refills: 0 | COMMUNITY
Start: 2014-06-24

## 2021-05-26 RX ORDER — NITROFURANTOIN MONOHYDRATE/MACROCRYSTALLINE 25; 75 MG/1; MG/1
TK ONE C PO BID FOR 7 DAYS CAPSULE ORAL
Qty: 14 | Refills: 0 | COMMUNITY
Start: 2011-08-03

## 2021-05-26 RX ORDER — DEXLANSOPRAZOLE 60 MG/1
TAKE 1 CAPSULE DAILY EVERY MORNING BEFORE BREAKFAST CAPSULE, DELAYED RELEASE ORAL
Qty: 90 | Refills: 1 | Status: ACTIVE | COMMUNITY

## 2021-05-26 RX ORDER — HYDROCORTISONE 20 MG/1
TABLET ORAL
Qty: 240 | Refills: 0 | COMMUNITY
Start: 2013-09-12

## 2021-05-26 RX ORDER — FORMOTEROL FUMARATE DIHYDRATE 20 UG/2ML
2 ML SOLUTION RESPIRATORY (INHALATION) TWICE A DAY
Status: ACTIVE | COMMUNITY

## 2021-05-26 RX ORDER — SODIUM, POTASSIUM,MAG SULFATES 17.5-3.13G
354 ML SOLUTION, RECONSTITUTED, ORAL ORAL ONCE
Qty: 354 MILLILITER | Refills: 0 | OUTPATIENT
Start: 2021-05-26 | End: 2021-05-27

## 2021-05-26 RX ORDER — FLUCONAZOLE 150 MG/1
TABLET ORAL
Qty: 1 | Refills: 0 | COMMUNITY
Start: 2013-10-03

## 2021-05-26 RX ORDER — LEVOTHYROXINE SODIUM 50 UG/1
TAKE 1 TABLET DAILY AS DIRECTED TABLET ORAL
Refills: 0 | Status: ACTIVE | COMMUNITY
Start: 2008-05-29

## 2021-05-26 RX ORDER — HYDROCORTISONE VALERATE CREAM 2 MG/G
CREAM TOPICAL
Qty: 60 | Refills: 0 | COMMUNITY
Start: 2012-06-11

## 2021-06-09 ENCOUNTER — OFFICE VISIT (OUTPATIENT)
Dept: URBAN - METROPOLITAN AREA CLINIC 113 | Facility: CLINIC | Age: 81
End: 2021-06-09

## 2021-08-30 ENCOUNTER — DASHBOARD ENCOUNTERS (OUTPATIENT)
Age: 81
End: 2021-08-30

## 2021-08-30 ENCOUNTER — OFFICE VISIT (OUTPATIENT)
Dept: URBAN - METROPOLITAN AREA CLINIC 113 | Facility: CLINIC | Age: 81
End: 2021-08-30
Payer: MEDICARE

## 2021-08-30 ENCOUNTER — LAB OUTSIDE AN ENCOUNTER (OUTPATIENT)
Dept: URBAN - METROPOLITAN AREA CLINIC 113 | Facility: CLINIC | Age: 81
End: 2021-08-30

## 2021-08-30 VITALS
TEMPERATURE: 97.1 F | BODY MASS INDEX: 19.33 KG/M2 | HEART RATE: 68 BPM | SYSTOLIC BLOOD PRESSURE: 158 MMHG | DIASTOLIC BLOOD PRESSURE: 70 MMHG | HEIGHT: 65 IN | WEIGHT: 116 LBS | RESPIRATION RATE: 20 BRPM

## 2021-08-30 DIAGNOSIS — K59.01 SLOW TRANSIT CONSTIPATION: ICD-10-CM

## 2021-08-30 DIAGNOSIS — Z86.010 HISTORY OF ADENOMATOUS POLYP OF COLON: ICD-10-CM

## 2021-08-30 DIAGNOSIS — K21.9 GERD: ICD-10-CM

## 2021-08-30 PROBLEM — 35298007 SLOW TRANSIT CONSTIPATION: Status: ACTIVE | Noted: 2021-05-25

## 2021-08-30 PROBLEM — 235595009 GASTROESOPHAGEAL REFLUX DISEASE: Status: ACTIVE | Noted: 2021-05-25

## 2021-08-30 PROCEDURE — 99214 OFFICE O/P EST MOD 30 MIN: CPT | Performed by: INTERNAL MEDICINE

## 2021-08-30 RX ORDER — FEXOFENADINE HCL 180 MG/1
TAKE 1 TABLET DAILY TABLET ORAL
Refills: 0 | COMMUNITY

## 2021-08-30 RX ORDER — HYDROCODONE BITARTRATE AND HOMATROPINE METHYLBROMIDE 5; 1.5 MG/5ML; MG/5ML
TK 1 TEA PO TID PRN FOR 14 DAYS SOLUTION ORAL
Qty: 120 | Refills: 0 | COMMUNITY
Start: 2011-08-19

## 2021-08-30 RX ORDER — FENOFIBRATE 160 MG/1
TK ONE T PO D TABLET ORAL
Qty: 30 | Refills: 0 | COMMUNITY
Start: 2010-07-08

## 2021-08-30 RX ORDER — CEPHALEXIN 500 MG/1
TK 4 CS PO 1 HOUR PRIOR TO APPOINTMENT CAPSULE ORAL
Qty: 12 | Refills: 0 | COMMUNITY
Start: 2011-11-07

## 2021-08-30 RX ORDER — FORMOTEROL FUMARATE DIHYDRATE 20 UG/2ML
2 ML SOLUTION RESPIRATORY (INHALATION) TWICE A DAY
Status: ACTIVE | COMMUNITY

## 2021-08-30 RX ORDER — FLUCONAZOLE 100 MG/1
TABLET ORAL
Qty: 3 | Refills: 0 | COMMUNITY
Start: 2014-12-02

## 2021-08-30 RX ORDER — NITROFURANTOIN MONOHYDRATE/MACROCRYSTALLINE 25; 75 MG/1; MG/1
TK ONE C PO BID FOR 7 DAYS CAPSULE ORAL
Qty: 14 | Refills: 0 | COMMUNITY
Start: 2011-08-03

## 2021-08-30 RX ORDER — OMEPRAZOLE 40 MG/1
TAKE 1 CAPSULE BEDTIME CAPSULE, DELAYED RELEASE ORAL
Refills: 6 | COMMUNITY

## 2021-08-30 RX ORDER — DEXLANSOPRAZOLE 60 MG/1
TAKE 1 CAPSULE DAILY EVERY MORNING BEFORE BREAKFAST CAPSULE, DELAYED RELEASE ORAL
Qty: 90 | Refills: 1 | Status: ACTIVE | COMMUNITY

## 2021-08-30 RX ORDER — LEVOTHYROXINE SODIUM 0.05 MG/1
TABLET ORAL
Qty: 90 | Refills: 0 | COMMUNITY
Start: 2013-06-24

## 2021-08-30 RX ORDER — PREDNISONE 10 MG/1
TABLET ORAL
Qty: 18 | Refills: 0 | COMMUNITY
Start: 2014-06-24

## 2021-08-30 RX ORDER — CEFUROXIME AXETIL 500 MG/1
TABLET, FILM COATED ORAL
Qty: 14 | Refills: 0 | COMMUNITY
Start: 2014-05-29

## 2021-08-30 RX ORDER — SODIUM, POTASSIUM,MAG SULFATES 17.5-3.13G
354 ML SOLUTION, RECONSTITUTED, ORAL ORAL ONCE
Qty: 354 MILLILITER | Refills: 0 | OUTPATIENT
Start: 2021-08-30 | End: 2021-08-31

## 2021-08-30 RX ORDER — AMOXICILLIN 875 MG/1
1 CAPSULE TABLET, FILM COATED ORAL TWICE A DAY
Refills: 0 | Status: ON HOLD | COMMUNITY
Start: 2013-06-29

## 2021-08-30 RX ORDER — DOXYCYCLINE 100 MG/1
CAPSULE ORAL
Qty: 10 | Refills: 0 | COMMUNITY
Start: 2014-05-24

## 2021-08-30 RX ORDER — OLMESARTAN MEDOXOMIL 20 MG/1
TAKE 1 TABLET DAILY TABLET, FILM COATED ORAL
Refills: 0 | Status: ACTIVE | COMMUNITY
Start: 2011-03-03

## 2021-08-30 RX ORDER — BUDESONIDE 0.5 MG/2ML
USE 1 UNIT DOSE VIA NEBULIZER TWO TIMES A DAY INHALANT ORAL
Refills: 0 | Status: ACTIVE | COMMUNITY

## 2021-08-30 RX ORDER — SUCRALFATE 1 G/10ML
SUSPENSION ORAL
Qty: 255 | Refills: 0 | COMMUNITY
Start: 2014-03-31

## 2021-08-30 RX ORDER — FAMOTIDINE 20 MG/1
TAKE 1 TABLET DAILY AS DIRECTED TABLET, FILM COATED ORAL
Refills: 0 | COMMUNITY

## 2021-08-30 RX ORDER — RALOXIFENE HCL 60 MG
TK 1 T PO QD TABLET ORAL
Qty: 30 | Refills: 0 | COMMUNITY
Start: 2011-07-11

## 2021-08-30 RX ORDER — FORMOTEROL FUMARATE DIHYDRATE 20 UG/2ML
USE 1 VIAL IN NEBULIZER EVERY 12 HOURS SOLUTION RESPIRATORY (INHALATION)
Refills: 0 | COMMUNITY

## 2021-08-30 RX ORDER — FENOFIBRATE 160 MG/1
TAKE 1 TABLET DAILY TABLET ORAL
Refills: 0 | COMMUNITY

## 2021-08-30 RX ORDER — VENLAFAXINE HYDROCHLORIDE 37.5 MG/1
1 CAPSULE WITH FOOD CAPSULE, EXTENDED RELEASE ORAL ONCE A DAY
Status: ACTIVE | COMMUNITY

## 2021-08-30 RX ORDER — CYCLOBENZAPRINE HYDROCHLORIDE 5 MG/1
TABLET, FILM COATED ORAL
Qty: 30 | Refills: 0 | COMMUNITY
Start: 2014-09-17

## 2021-08-30 RX ORDER — MONTELUKAST SODIUM 10 MG/1
1 TABLET TABLET, FILM COATED ORAL ONCE A DAY
Status: ACTIVE | COMMUNITY

## 2021-08-30 RX ORDER — PREDNISONE 20 MG/1
TABLET ORAL
Qty: 5 | Refills: 0 | COMMUNITY
Start: 2013-09-20

## 2021-08-30 RX ORDER — LEVOTHYROXINE SODIUM 50 UG/1
TAKE 1 TABLET DAILY AS DIRECTED TABLET ORAL
Refills: 0 | Status: ACTIVE | COMMUNITY
Start: 2008-05-29

## 2021-08-30 RX ORDER — MOXIFLOXACIN HYDROCHLORIDE TABLETS, 400 MG 400 MG/1
TABLET, FILM COATED ORAL
Qty: 10 | Refills: 0 | COMMUNITY
Start: 2014-02-07

## 2021-08-30 RX ORDER — FLUTICASONE PROPIONATE 50 UG/1
SPRAY, METERED NASAL
Qty: 16 | Refills: 0 | COMMUNITY
Start: 2014-05-24

## 2021-08-30 RX ORDER — AZELASTINE 137 UG/1
SPRAY, METERED NASAL
Qty: 30 | Refills: 0 | COMMUNITY
Start: 2014-05-24

## 2021-08-30 RX ORDER — FLUCONAZOLE 150 MG/1
TABLET ORAL
Qty: 1 | Refills: 0 | COMMUNITY
Start: 2013-10-03

## 2021-08-30 RX ORDER — AZITHROMYCIN DIHYDRATE 250 MG/1
FPD TABLET, FILM COATED ORAL
Qty: 6 | Refills: 0 | COMMUNITY
Start: 2011-08-19

## 2021-08-30 RX ORDER — HYDROCORTISONE 20 MG/1
TABLET ORAL
Qty: 240 | Refills: 0 | COMMUNITY
Start: 2013-09-12

## 2021-08-30 RX ORDER — ONDANSETRON HYDROCHLORIDE 4 MG/1
TABLET, FILM COATED ORAL
Qty: 12 | Refills: 0 | COMMUNITY
Start: 2014-11-10

## 2021-08-30 RX ORDER — HYDROCORTISONE VALERATE CREAM 2 MG/G
CREAM TOPICAL
Qty: 60 | Refills: 0 | COMMUNITY
Start: 2012-06-11

## 2021-08-30 NOTE — HPI-TODAY'S VISIT:
81-year-old female with a history of atrial fibrillation status post cardioversion, asthma, GERD, presenting for follow-up after hospitalization.  She was seen in the office in November 2017 by Dr. Flowers for follow-up regarding GERD and recurrent pulmonary infections, with question of recurrent aspiration.  An EGD with Bravo pH was recommended to assess for pathologic reflux, but she did not proceed with the procedure. She was seen in hospital consultation on 4/20/2021 by Dr. Blount for evaluation of abdominal pain and nausea/vomiting.  Recent CT and abdominal ultrasound were unremarkable.  She was recommended pantoprazole 40 mg twice daily and famotidine 40 mg prior to bedtime for management of GERD and functional dyspepsia. An EGD was planned for evaluation of anemia. . She feels much better. No n/v. No constipation. No rectal bleeding or melena. No abd pain. Weight stable.  . EGD 4/21/2021 was notable for three nonbleeding lesser curvature and fundic AVMs status post argon plasma coagulation. . In April 2021,  her hemoglobin was 9.9 at the time of hospital discharge, with MCV 86.5.  . CT of the abdomen pelvis with contrast on 4/14/2021 showed diverticulosis and bilateral renal cysts. Abdominal ultrasound 2/23/2021 revealed mild intrahepatic biliary duct dilation with enlarged common bile duct measuring 10 mm, which may be physiologic due to prior cholecystectomy.  Also notable for chronic medical renal disease. . 1/20/17 EGD: Small hiatal hernia, chronic gastritis, normal examined duodenum.  Antral biopsies showed chemical gastropathy.  No evidence of H. pylori or intestinal metaplasia.  Negative for dysplasia or malignancy. . Colonoscopy was performed 6/14/2016 by Dr. Flowers.  Findings included diverticulosis in the sigmoid, descending and transverse colon and one 8 mm tubular adenoma in the ascending colon.  . (8/28/15): Poor colonoscopy prep. One 6 mm tubular adenematous polyp in the sigmoid colon. Diverticulosis in entire examined colon. Normal ileum. Exam otherwise normal on direct and retroflexion views. . EGD (8/3/09) : regular Z-line. Otherwise normal. Bravo pH probe off therapy demonstrating no pathologic reflux. .

## 2021-12-06 ENCOUNTER — OFFICE VISIT (OUTPATIENT)
Dept: URBAN - METROPOLITAN AREA SURGERY CENTER 25 | Facility: SURGERY CENTER | Age: 81
End: 2021-12-06

## 2021-12-17 PROBLEM — 429047008 HISTORY OF ADENOMATOUS POLYP OF COLON: Status: ACTIVE | Noted: 2021-05-26

## 2022-02-10 ENCOUNTER — OFFICE VISIT (OUTPATIENT)
Dept: URBAN - METROPOLITAN AREA SURGERY CENTER 25 | Facility: SURGERY CENTER | Age: 82
End: 2022-02-10